# Patient Record
(demographics unavailable — no encounter records)

---

## 2024-10-16 NOTE — HISTORY OF PRESENT ILLNESS
[Sudden] : sudden [5] : 5 [0] : 0 [Dull/Aching] : dull/aching [Sharp] : sharp [Throbbing] : throbbing [Intermittent] : intermittent [Household chores] : household chores [Leisure] : leisure [Social interactions] : social interactions [Rest] : rest [Retired] : Work status: retired [de-identified] : Patient here for right ankle. Patient states NKI. Patient states pain started 3 months ago. Patient states pain is sharp in the Achilles with weight bearing. Patient came into office ambulating in sneakers. [] : Post Surgical Visit: no [FreeTextEntry1] : Right ankle  [FreeTextEntry3] : 3 months ago  [FreeTextEntry5] : NKI  [de-identified] : Movement

## 2024-10-16 NOTE — ASSESSMENT
[FreeTextEntry1] : 84yoF with right ankle posterior tibial tendonitis, achilles tendonitis.  Recommend nonsurgical management.  -rx for PT -Activities as tolerated -Rest, ice, compression, elevation, NSAIDs PRN for pain.  -All questions answered -F/u prn   The patient was advised to consult with their primary medical provider prior to initiation of any new medications to reduce the risk of adverse effects specific to their long-term home medications and medical history. The risk of gastrointestinal irritation and kidney injury specific to long-term NSAID use was discussed.

## 2024-10-16 NOTE — PHYSICAL EXAM
[de-identified] : Examination of the right foot and ankle is as follows: INSPECTION: swelling, no ecchymosis of foot and ankle, mild swelling of dorsal foot and lateral ankle, but no abrasion, laceration, no erythema PALPATION: ttp achilles and posterior tibial tendon ROM: plantarflexion 40 degrees, inversion 15 degrees, eversion 10 degrees, dorsiflexion 10 degrees STRENGTH: dorsiflexion 4/5, plantar flexion 4/5, inversion 4/5, eversion 4/5, EHL 5/5, FHL 5/5 VASCULAR: dorsalis pedis pulse: 2+, posterior tibialis pulse: 2+ NEURO: Sensation present to light touch in all distributions GAIT: antalgic, ambulation without assisted devices  X-rays of the right ankle is as follows:  Ankle 3 view: No fractures, subluxations or dislocations. No major abnormalities.